# Patient Record
Sex: MALE | Race: WHITE | NOT HISPANIC OR LATINO | Employment: FULL TIME
[De-identification: names, ages, dates, MRNs, and addresses within clinical notes are randomized per-mention and may not be internally consistent; named-entity substitution may affect disease eponyms.]

---

## 2022-07-17 ENCOUNTER — HEALTH MAINTENANCE LETTER (OUTPATIENT)
Age: 52
End: 2022-07-17

## 2022-09-25 ENCOUNTER — HEALTH MAINTENANCE LETTER (OUTPATIENT)
Age: 52
End: 2022-09-25

## 2022-11-18 ENCOUNTER — HOSPITAL ENCOUNTER (OUTPATIENT)
Dept: ULTRASOUND IMAGING | Facility: HOSPITAL | Age: 52
Discharge: HOME | End: 2022-11-18
Attending: PHYSICIAN ASSISTANT | Admitting: PHYSICIAN ASSISTANT
Payer: COMMERCIAL

## 2022-11-18 ENCOUNTER — TELEPHONE (OUTPATIENT)
Dept: FAMILY MEDICINE | Facility: CLINIC | Age: 52
End: 2022-11-18

## 2022-11-18 ENCOUNTER — OFFICE VISIT (OUTPATIENT)
Dept: FAMILY MEDICINE | Facility: CLINIC | Age: 52
End: 2022-11-18
Payer: COMMERCIAL

## 2022-11-18 VITALS
TEMPERATURE: 97.8 F | HEART RATE: 71 BPM | RESPIRATION RATE: 20 BRPM | WEIGHT: 238 LBS | DIASTOLIC BLOOD PRESSURE: 99 MMHG | OXYGEN SATURATION: 100 % | SYSTOLIC BLOOD PRESSURE: 171 MMHG

## 2022-11-18 DIAGNOSIS — N50.89 SCROTAL MASS: ICD-10-CM

## 2022-11-18 DIAGNOSIS — N50.89 SCROTAL MASS: Primary | ICD-10-CM

## 2022-11-18 DIAGNOSIS — I10 HYPERTENSION, UNSPECIFIED TYPE: ICD-10-CM

## 2022-11-18 DIAGNOSIS — N50.812 PAIN IN LEFT TESTICLE: ICD-10-CM

## 2022-11-18 DIAGNOSIS — I86.1 VARICOCELE: ICD-10-CM

## 2022-11-18 LAB
ALBUMIN UR-MCNC: NEGATIVE MG/DL
ANION GAP SERPL CALCULATED.3IONS-SCNC: 13 MMOL/L (ref 7–15)
APPEARANCE UR: CLEAR
BACTERIA #/AREA URNS HPF: ABNORMAL /HPF
BILIRUB UR QL STRIP: NEGATIVE
BUN SERPL-MCNC: 16.8 MG/DL (ref 6–20)
CALCIUM SERPL-MCNC: 9.5 MG/DL (ref 8.6–10)
CHLORIDE SERPL-SCNC: 100 MMOL/L (ref 98–107)
COLOR UR AUTO: YELLOW
CREAT SERPL-MCNC: 0.86 MG/DL (ref 0.67–1.17)
DEPRECATED HCO3 PLAS-SCNC: 27 MMOL/L (ref 22–29)
GFR SERPL CREATININE-BSD FRML MDRD: >90 ML/MIN/1.73M2
GLUCOSE SERPL-MCNC: 104 MG/DL (ref 70–99)
GLUCOSE UR STRIP-MCNC: NEGATIVE MG/DL
HGB UR QL STRIP: NEGATIVE
KETONES UR STRIP-MCNC: NEGATIVE MG/DL
LEUKOCYTE ESTERASE UR QL STRIP: NEGATIVE
NITRATE UR QL: POSITIVE
PH UR STRIP: 5.5 [PH] (ref 5–8)
POTASSIUM SERPL-SCNC: 3.9 MMOL/L (ref 3.4–5.3)
RBC #/AREA URNS AUTO: ABNORMAL /HPF
SODIUM SERPL-SCNC: 140 MMOL/L (ref 136–145)
SP GR UR STRIP: 1.02 (ref 1–1.03)
SQUAMOUS #/AREA URNS AUTO: ABNORMAL /LPF
UROBILINOGEN UR STRIP-ACNC: 0.2 E.U./DL
WBC #/AREA URNS AUTO: ABNORMAL /HPF

## 2022-11-18 PROCEDURE — 84443 ASSAY THYROID STIM HORMONE: CPT | Performed by: PHYSICIAN ASSISTANT

## 2022-11-18 PROCEDURE — 36415 COLL VENOUS BLD VENIPUNCTURE: CPT | Performed by: PHYSICIAN ASSISTANT

## 2022-11-18 PROCEDURE — 93976 VASCULAR STUDY: CPT

## 2022-11-18 PROCEDURE — 81001 URINALYSIS AUTO W/SCOPE: CPT | Performed by: PHYSICIAN ASSISTANT

## 2022-11-18 PROCEDURE — 87086 URINE CULTURE/COLONY COUNT: CPT | Performed by: PHYSICIAN ASSISTANT

## 2022-11-18 PROCEDURE — 80048 BASIC METABOLIC PNL TOTAL CA: CPT | Performed by: PHYSICIAN ASSISTANT

## 2022-11-18 PROCEDURE — 99204 OFFICE O/P NEW MOD 45 MIN: CPT | Performed by: PHYSICIAN ASSISTANT

## 2022-11-18 RX ORDER — LISINOPRIL 5 MG/1
5 TABLET ORAL DAILY
Qty: 30 TABLET | Refills: 0 | Status: SHIPPED | OUTPATIENT
Start: 2022-11-18

## 2022-11-18 NOTE — TELEPHONE ENCOUNTER
Pt calling for advise on where to go with testicle pain.     RN unable to find appointment at nearby locations for same day.     Pt states his testicle pain has been on and off for two weeks but is more persistent the last two days.     Pt unsure if his symptoms are appropriate for urgent care.    RN advised urgent care is appropriate as pt is unable to get scheduled anywhere else.     Paula Starkey RN

## 2022-11-18 NOTE — PROGRESS NOTES
"Assessment & Plan     Scrotal mass    - US Testicular & Scrotum w Doppler Ltd    Pain in left testicle  Patient was seen for a new left scrotal mass which clinically appeared to be a varicocele and ultrasound confirmed this.  Patient is symptomatic and he is interested in having a conversation with urology to consider removal and treatment options.  Referral was placed.  - US Testicular & Scrotum w Doppler Ltd    Hypertension, unspecified type  Patient is interested in antihypertensive medications today Will obtain initial labs and start him on lisinopril as ordered.  He prefers to start with a very low dose and aware he may need additional medications or higher dose.  He will monitor blood pressure at home 2-3 times per week and bring those readings with him to his follow-up visit with a new primary care provider.  We will have him schedule an appointment in the next 2 to 4 weeks.  - Basic metabolic panel  (Ca, Cl, CO2, Creat, Gluc, K, Na, BUN)  - UA macro with reflex to Microscopic and Culture - Clinc Collect  - Basic metabolic panel  (Ca, Cl, CO2, Creat, Gluc, K, Na, BUN)  - lisinopril (ZESTRIL) 5 MG tablet  Dispense: 30 tablet; Refill: 0  - Urine Microscopic Exam  - Urine Culture  - TSH with free T4 reflex    Varicocele    - Adult Urology  Referral     30 minutes spent on the date of the encounter doing chart review, review of test results, interpretation of tests, patient visit and documentation         Return for recheck in 2-4 weeks.  .    Stephanie Bassett PA-C  [facility] SMILEY Garcia is a 51 year old male who presents to clinic today for the following health issues:  Chief Complaint   Patient presents with     Penis/Scrotum Problem     Lt testicle/scrotum dull and aching x 1 month. Pt states felt a mass \"next testicle. No swelling. No tenderness.      HPI patient presents to urgent care with concerns regarding left testicle mass that is new over the last " "month.  Mild pain at first, waxing and waning.  More consistent the last few days.    Initially intermittent aching.    New lump/bump that was not previously there.  Warm water helpful.    No urinary frequency, urgency, hematuria.    No pcp currently.    2-3 weeks ago large 9 mile hike,  L leg painful, got better without incident.  Resolved.    In addition brought up patient's elevated blood pressure today he states he has had high blood pressure for \"quite some time\" he has not been on medication for this in the past but has monitored it intermittently at home for several years.  He states he is ready to manage it medically as he has attempted weight loss without improvement in his elevated blood pressure he has no primary care provider and is in need of establishing care      Review of Systems  Constitutional, HEENT, cardiovascular, pulmonary, gi and gu systems are negative, except as otherwise noted.      Objective    BP (!) 171/99 (BP Location: Right arm, Patient Position: Sitting, Cuff Size: Adult Large)   Pulse 71   Temp 97.8  F (36.6  C) (Oral)   Resp 20   Wt 108 kg (238 lb)   SpO2 100%   Physical Exam   Examination of the genital area reveals testicles symmetrically in size without masses.  There is a mass just proximal to the testicle which appears to be associated with the cord but not the testicle itself.  He has a fluctuant bag of worms consistency and is approximately 2 cm x 2 cm in size.  It is tender to palpation.  There are no additional scrotal masses.  Heart regular rate and rhythm.  Lungs are clear to auscultation.  Results for orders placed or performed during the hospital encounter of 11/18/22   US Testicular & Scrotum w Doppler Ltd     Status: None    Narrative    EXAM: US TESTICULAR AND SCROTUM WITH DOPPLER LIMITED  LOCATION: [facility]  DATE/TIME: 11/18/2022 2:57 PM    INDICATION: Left testicle heaviness and pain.  COMPARISON: None.  TECHNIQUE: Ultrasound of " scrotum with color flow and spectral Doppler with waveform analysis performed.    FINDINGS:    RIGHT: Right testicle measures 5.1 x 3.1 x 4.4 cm. Testicle contains a few scattered punctate echogenic foci which do not meet criteria for microlithiasis. Testicular parenchyma is otherwise homogenous. No intratesticular mass. Normal arterial duplex and   normal color flow. Normal epididymis. Trace physiologic hydrocele. No varicocele.    LEFT: Left testicle measures 5.2 x 3.2 x 3.6 cm. Testicle contains at least one punctate echogenic focus which does not meet criteria for microlithiasis. Testicular parenchyma is otherwise homogenous. No intratesticular mass. Normal arterial duplex and   normal color flow. Tiny 3 mm epididymal head cyst. Trace physiologic hydrocele. Small left varicocele, which corresponds to the palpable abnormality.      Impression    IMPRESSION:    1.  Small left varicocele, which corresponds to the palpable abnormality.    2.  No intratesticular mass.    3.  Tiny 3 mm left epididymal head cyst.   Results for orders placed or performed in visit on 11/18/22   UA macro with reflex to Microscopic and Culture - Clinc Collect     Status: Abnormal    Specimen: Urine, NOS   Result Value Ref Range    Color Urine Yellow Colorless, Straw, Light Yellow, Yellow    Appearance Urine Clear Clear    Glucose Urine Negative Negative mg/dL    Bilirubin Urine Negative Negative    Ketones Urine Negative Negative mg/dL    Specific Gravity Urine 1.020 1.005 - 1.030    Blood Urine Negative Negative    pH Urine 5.5 5.0 - 8.0    Protein Albumin Urine Negative Negative mg/dL    Urobilinogen Urine 0.2 0.2, 1.0 E.U./dL    Nitrite Urine Positive (A) Negative    Leukocyte Esterase Urine Negative Negative   Basic metabolic panel  (Ca, Cl, CO2, Creat, Gluc, K, Na, BUN)     Status: Abnormal   Result Value Ref Range    Sodium 140 136 - 145 mmol/L    Potassium 3.9 3.4 - 5.3 mmol/L    Chloride 100 98 - 107 mmol/L    Carbon Dioxide (CO2)  27 22 - 29 mmol/L    Anion Gap 13 7 - 15 mmol/L    Urea Nitrogen 16.8 6.0 - 20.0 mg/dL    Creatinine 0.86 0.67 - 1.17 mg/dL    Calcium 9.5 8.6 - 10.0 mg/dL    Glucose 104 (H) 70 - 99 mg/dL    GFR Estimate >90 >60 mL/min/1.73m2   Urine Microscopic Exam     Status: Abnormal   Result Value Ref Range    Bacteria Urine None Seen None Seen /HPF    RBC Urine None Seen 0-2 /HPF /HPF    WBC Urine 0-5 0-5 /HPF /HPF    Squamous Epithelials Urine Few (A) None Seen /LPF

## 2022-11-19 LAB — TSH SERPL DL<=0.005 MIU/L-ACNC: 0.99 UIU/ML (ref 0.3–4.2)

## 2022-11-20 LAB — BACTERIA UR CULT: NO GROWTH

## 2022-12-16 DIAGNOSIS — I10 HYPERTENSION, UNSPECIFIED TYPE: ICD-10-CM

## 2022-12-16 RX ORDER — LISINOPRIL 5 MG/1
TABLET ORAL
Qty: 30 TABLET | Refills: 0 | OUTPATIENT
Start: 2022-12-16

## 2023-08-06 ENCOUNTER — HEALTH MAINTENANCE LETTER (OUTPATIENT)
Age: 53
End: 2023-08-06

## 2024-09-28 ENCOUNTER — HEALTH MAINTENANCE LETTER (OUTPATIENT)
Age: 54
End: 2024-09-28